# Patient Record
(demographics unavailable — no encounter records)

---

## 2024-10-08 NOTE — DISCUSSION/SUMMARY
[de-identified] : Patient is s/p 7/10/234 LEFT CHANELLE. Patient reports she is progressing well.   The patient was advised of the diagnosis. The natural history of the pathology was explained in full to the patient in layman's terms. Several different treatment options were discussed and explained in full to the patient including the risks and benefits of both surgical and non-surgical treatments. All questions and concerns were answered.  Discussed importance of non-impact exercise and muscle stretching before and after exercise. Reviewed x-rays Explained the importance of ice and rest.  Stretching exercises shown.   Continue home strengthening and stretching program consisting of non-impact exercises and ice as needed.  Follow up in 9 months

## 2024-10-08 NOTE — PHYSICAL EXAM
[5___] : adduction 5[unfilled]/5 [] : ambulation with cane [Left] : left hip with pelvis [All Views] : anteroposterior, lateral [Components well fixed, in good position] : Components well fixed, in good position [FreeTextEntry9] : Flex 105* IR 45* Ext 45* ABD 45*

## 2024-10-08 NOTE — REASON FOR VISIT
[FreeTextEntry2] : HERE FOR F/U 7/10/234 LEFT CHANELLE.  DOING WELL, STILL GOING TO PT 1 X WEEK . NO PAIN MEDS.

## 2025-07-10 NOTE — HISTORY OF PRESENT ILLNESS
[de-identified] : 7/10/25  here for 1 yr f/u left CHANELLE 7/10/24 .  doing well, no complaints  10/08/24 HERE FOR F/U 7/10/234 LEFT CHANELLE.  DOING WELL, STILL GOING TO PT 1 X WEEK . NO PAIN MEDS.

## 2025-07-10 NOTE — PHYSICAL EXAM
[5___] : adduction 5[unfilled]/5 [] : patient ambulates without assistive device [Left] : left hip with pelvis [All Views] : anteroposterior, lateral [Components well fixed, in good position] : Components well fixed, in good position [FreeTextEntry9] : I obtained and independently interpreted todays x ray 07/10/2025

## 2025-07-10 NOTE — DISCUSSION/SUMMARY
[de-identified] : Patient continues to progress well at this time. Patient demonstrates continued improvement and maintenance of both strength and range of motion. Patient expresses that they are feeling very well at this time, and reports being very pleased with the outcome at this time. Reviewed x-rays with patient, demonstrating well fixed components. Discussed importance of non-impact exercise and muscle stretching before and after exercise. Stretching exercises shown and demonstrated in office for patient to continue with daily. Explained the importance of ice and rest. Continue home strengthening and stretching program consisting of non-impact exercises and ice as needed.     Patient was instructed at this time that they are to follow up every 2-3 years for continued evaluation, or sooner if any problems or concerns should arise. Patient expressed understanding of this.